# Patient Record
Sex: MALE | Race: OTHER | NOT HISPANIC OR LATINO | URBAN - METROPOLITAN AREA
[De-identification: names, ages, dates, MRNs, and addresses within clinical notes are randomized per-mention and may not be internally consistent; named-entity substitution may affect disease eponyms.]

---

## 2021-07-13 ENCOUNTER — INPATIENT (INPATIENT)
Facility: HOSPITAL | Age: 41
LOS: 1 days | Discharge: HOME | DRG: 864 | End: 2021-07-15
Attending: INTERNAL MEDICINE | Admitting: STUDENT IN AN ORGANIZED HEALTH CARE EDUCATION/TRAINING PROGRAM
Payer: COMMERCIAL

## 2021-07-13 VITALS
WEIGHT: 162.04 LBS | SYSTOLIC BLOOD PRESSURE: 117 MMHG | RESPIRATION RATE: 18 BRPM | HEIGHT: 69 IN | TEMPERATURE: 101 F | HEART RATE: 111 BPM | DIASTOLIC BLOOD PRESSURE: 67 MMHG | OXYGEN SATURATION: 96 %

## 2021-07-13 DIAGNOSIS — R50.9 FEVER, UNSPECIFIED: ICD-10-CM

## 2021-07-13 LAB
ALBUMIN SERPL ELPH-MCNC: 4.2 G/DL — SIGNIFICANT CHANGE UP (ref 3.3–5)
ALP SERPL-CCNC: 84 U/L — SIGNIFICANT CHANGE UP (ref 40–120)
ALT FLD-CCNC: 59 U/L — HIGH (ref 10–45)
ANION GAP SERPL CALC-SCNC: 15 MMOL/L — SIGNIFICANT CHANGE UP (ref 5–17)
APPEARANCE UR: CLEAR — SIGNIFICANT CHANGE UP
AST SERPL-CCNC: 78 U/L — HIGH (ref 10–40)
BACTERIA # UR AUTO: NEGATIVE — SIGNIFICANT CHANGE UP
BASE EXCESS BLDV CALC-SCNC: -0.8 MMOL/L — SIGNIFICANT CHANGE UP (ref -2–2)
BASE EXCESS BLDV CALC-SCNC: -0.9 MMOL/L — SIGNIFICANT CHANGE UP (ref -2–2)
BASOPHILS # BLD AUTO: 0 K/UL — SIGNIFICANT CHANGE UP (ref 0–0.2)
BASOPHILS NFR BLD AUTO: 0 % — SIGNIFICANT CHANGE UP (ref 0–2)
BILIRUB SERPL-MCNC: 2.1 MG/DL — HIGH (ref 0.2–1.2)
BILIRUB UR-MCNC: NEGATIVE — SIGNIFICANT CHANGE UP
BUN SERPL-MCNC: 12 MG/DL — SIGNIFICANT CHANGE UP (ref 7–23)
CA-I SERPL-SCNC: 1.1 MMOL/L — LOW (ref 1.12–1.3)
CA-I SERPL-SCNC: 1.2 MMOL/L — SIGNIFICANT CHANGE UP (ref 1.12–1.3)
CALCIUM SERPL-MCNC: 9.1 MG/DL — SIGNIFICANT CHANGE UP (ref 8.4–10.5)
CHLORIDE BLDV-SCNC: 103 MMOL/L — SIGNIFICANT CHANGE UP (ref 96–108)
CHLORIDE BLDV-SCNC: 103 MMOL/L — SIGNIFICANT CHANGE UP (ref 96–108)
CHLORIDE SERPL-SCNC: 99 MMOL/L — SIGNIFICANT CHANGE UP (ref 96–108)
CO2 BLDV-SCNC: 25 MMOL/L — SIGNIFICANT CHANGE UP (ref 22–30)
CO2 BLDV-SCNC: 26 MMOL/L — SIGNIFICANT CHANGE UP (ref 22–30)
CO2 SERPL-SCNC: 19 MMOL/L — LOW (ref 22–31)
COLOR SPEC: YELLOW — SIGNIFICANT CHANGE UP
COMMENT - URINE: SIGNIFICANT CHANGE UP
CREAT SERPL-MCNC: 1.21 MG/DL — SIGNIFICANT CHANGE UP (ref 0.5–1.3)
DIFF PNL FLD: NEGATIVE — SIGNIFICANT CHANGE UP
EOSINOPHIL # BLD AUTO: 0 K/UL — SIGNIFICANT CHANGE UP (ref 0–0.5)
EOSINOPHIL NFR BLD AUTO: 0 % — SIGNIFICANT CHANGE UP (ref 0–6)
EPI CELLS # UR: 0 /HPF — SIGNIFICANT CHANGE UP
GAS PNL BLDV: 131 MMOL/L — LOW (ref 135–145)
GAS PNL BLDV: 135 MMOL/L — SIGNIFICANT CHANGE UP (ref 135–145)
GAS PNL BLDV: SIGNIFICANT CHANGE UP
GLUCOSE BLDV-MCNC: 109 MG/DL — HIGH (ref 70–99)
GLUCOSE BLDV-MCNC: 118 MG/DL — HIGH (ref 70–99)
GLUCOSE SERPL-MCNC: 122 MG/DL — HIGH (ref 70–99)
GLUCOSE UR QL: ABNORMAL
HCO3 BLDV-SCNC: 24 MMOL/L — SIGNIFICANT CHANGE UP (ref 21–29)
HCO3 BLDV-SCNC: 25 MMOL/L — SIGNIFICANT CHANGE UP (ref 21–29)
HCT VFR BLD CALC: 42.2 % — SIGNIFICANT CHANGE UP (ref 39–50)
HCT VFR BLDA CALC: 44 % — SIGNIFICANT CHANGE UP (ref 39–50)
HCT VFR BLDA CALC: 48 % — SIGNIFICANT CHANGE UP (ref 39–50)
HGB BLD CALC-MCNC: 14.3 G/DL — SIGNIFICANT CHANGE UP (ref 13–17)
HGB BLD CALC-MCNC: 15.8 G/DL — SIGNIFICANT CHANGE UP (ref 13–17)
HGB BLD-MCNC: 14.9 G/DL — SIGNIFICANT CHANGE UP (ref 13–17)
KETONES UR-MCNC: NEGATIVE — SIGNIFICANT CHANGE UP
LACTATE BLDV-MCNC: 1.6 MMOL/L — SIGNIFICANT CHANGE UP (ref 0.7–2)
LACTATE BLDV-MCNC: 3.7 MMOL/L — HIGH (ref 0.7–2)
LEUKOCYTE ESTERASE UR-ACNC: NEGATIVE — SIGNIFICANT CHANGE UP
LYMPHOCYTES # BLD AUTO: 0.39 K/UL — LOW (ref 1–3.3)
LYMPHOCYTES # BLD AUTO: 8.6 % — LOW (ref 13–44)
MANUAL SMEAR VERIFICATION: SIGNIFICANT CHANGE UP
MCHC RBC-ENTMCNC: 30.3 PG — SIGNIFICANT CHANGE UP (ref 27–34)
MCHC RBC-ENTMCNC: 35.3 GM/DL — SIGNIFICANT CHANGE UP (ref 32–36)
MCV RBC AUTO: 85.8 FL — SIGNIFICANT CHANGE UP (ref 80–100)
MONOCYTES # BLD AUTO: 0.12 K/UL — SIGNIFICANT CHANGE UP (ref 0–0.9)
MONOCYTES NFR BLD AUTO: 2.6 % — SIGNIFICANT CHANGE UP (ref 2–14)
NEUTROPHILS # BLD AUTO: 3.94 K/UL — SIGNIFICANT CHANGE UP (ref 1.8–7.4)
NEUTROPHILS NFR BLD AUTO: 69 % — SIGNIFICANT CHANGE UP (ref 43–77)
NEUTS BAND # BLD: 17.2 % — HIGH (ref 0–8)
NITRITE UR-MCNC: NEGATIVE — SIGNIFICANT CHANGE UP
PCO2 BLDV: 40 MMHG — SIGNIFICANT CHANGE UP (ref 35–50)
PCO2 BLDV: 46 MMHG — SIGNIFICANT CHANGE UP (ref 35–50)
PH BLDV: 7.35 — SIGNIFICANT CHANGE UP (ref 7.35–7.45)
PH BLDV: 7.39 — SIGNIFICANT CHANGE UP (ref 7.35–7.45)
PH UR: 6 — SIGNIFICANT CHANGE UP (ref 5–8)
PLAT MORPH BLD: NORMAL — SIGNIFICANT CHANGE UP
PLATELET # BLD AUTO: 127 K/UL — LOW (ref 150–400)
PO2 BLDV: 24 MMHG — LOW (ref 25–45)
PO2 BLDV: 42 MMHG — SIGNIFICANT CHANGE UP (ref 25–45)
POTASSIUM BLDV-SCNC: 3.4 MMOL/L — LOW (ref 3.5–5.3)
POTASSIUM BLDV-SCNC: 3.9 MMOL/L — SIGNIFICANT CHANGE UP (ref 3.5–5.3)
POTASSIUM SERPL-MCNC: 3.5 MMOL/L — SIGNIFICANT CHANGE UP (ref 3.5–5.3)
POTASSIUM SERPL-SCNC: 3.5 MMOL/L — SIGNIFICANT CHANGE UP (ref 3.5–5.3)
PROT SERPL-MCNC: 7.4 G/DL — SIGNIFICANT CHANGE UP (ref 6–8.3)
PROT UR-MCNC: ABNORMAL
RAPID RVP RESULT: SIGNIFICANT CHANGE UP
RBC # BLD: 4.92 M/UL — SIGNIFICANT CHANGE UP (ref 4.2–5.8)
RBC # FLD: 12 % — SIGNIFICANT CHANGE UP (ref 10.3–14.5)
RBC BLD AUTO: SIGNIFICANT CHANGE UP
RBC CASTS # UR COMP ASSIST: 0 /HPF — SIGNIFICANT CHANGE UP (ref 0–4)
SAO2 % BLDV: 41 % — LOW (ref 67–88)
SAO2 % BLDV: 74 % — SIGNIFICANT CHANGE UP (ref 67–88)
SARS-COV-2 RNA SPEC QL NAA+PROBE: SIGNIFICANT CHANGE UP
SODIUM SERPL-SCNC: 133 MMOL/L — LOW (ref 135–145)
SP GR SPEC: 1.02 — SIGNIFICANT CHANGE UP (ref 1.01–1.02)
UROBILINOGEN FLD QL: ABNORMAL
VARIANT LYMPHS # BLD: 2.6 % — SIGNIFICANT CHANGE UP (ref 0–6)
WBC # BLD: 4.57 K/UL — SIGNIFICANT CHANGE UP (ref 3.8–10.5)
WBC # FLD AUTO: 4.57 K/UL — SIGNIFICANT CHANGE UP (ref 3.8–10.5)
WBC UR QL: 1 /HPF — SIGNIFICANT CHANGE UP (ref 0–5)

## 2021-07-13 PROCEDURE — 93010 ELECTROCARDIOGRAM REPORT: CPT

## 2021-07-13 PROCEDURE — 99285 EMERGENCY DEPT VISIT HI MDM: CPT

## 2021-07-13 PROCEDURE — 74177 CT ABD & PELVIS W/CONTRAST: CPT | Mod: 26,MA

## 2021-07-13 PROCEDURE — 71045 X-RAY EXAM CHEST 1 VIEW: CPT | Mod: 26

## 2021-07-13 PROCEDURE — 99223 1ST HOSP IP/OBS HIGH 75: CPT

## 2021-07-13 PROCEDURE — 76705 ECHO EXAM OF ABDOMEN: CPT | Mod: 26,RT

## 2021-07-13 RX ORDER — ACETAMINOPHEN 500 MG
650 TABLET ORAL ONCE
Refills: 0 | Status: COMPLETED | OUTPATIENT
Start: 2021-07-13 | End: 2021-07-13

## 2021-07-13 RX ORDER — SODIUM CHLORIDE 9 MG/ML
1000 INJECTION INTRAMUSCULAR; INTRAVENOUS; SUBCUTANEOUS
Refills: 0 | Status: DISCONTINUED | OUTPATIENT
Start: 2021-07-13 | End: 2021-07-14

## 2021-07-13 RX ORDER — PIPERACILLIN AND TAZOBACTAM 4; .5 G/20ML; G/20ML
3.38 INJECTION, POWDER, LYOPHILIZED, FOR SOLUTION INTRAVENOUS ONCE
Refills: 0 | Status: COMPLETED | OUTPATIENT
Start: 2021-07-13 | End: 2021-07-13

## 2021-07-13 RX ORDER — SODIUM CHLORIDE 9 MG/ML
2200 INJECTION INTRAMUSCULAR; INTRAVENOUS; SUBCUTANEOUS ONCE
Refills: 0 | Status: COMPLETED | OUTPATIENT
Start: 2021-07-13 | End: 2021-07-13

## 2021-07-13 RX ADMIN — Medication 650 MILLIGRAM(S): at 19:56

## 2021-07-13 RX ADMIN — PIPERACILLIN AND TAZOBACTAM 3.38 GRAM(S): 4; .5 INJECTION, POWDER, LYOPHILIZED, FOR SOLUTION INTRAVENOUS at 21:09

## 2021-07-13 RX ADMIN — PIPERACILLIN AND TAZOBACTAM 200 GRAM(S): 4; .5 INJECTION, POWDER, LYOPHILIZED, FOR SOLUTION INTRAVENOUS at 20:06

## 2021-07-13 RX ADMIN — SODIUM CHLORIDE 75 MILLILITER(S): 9 INJECTION INTRAMUSCULAR; INTRAVENOUS; SUBCUTANEOUS at 23:36

## 2021-07-13 RX ADMIN — SODIUM CHLORIDE 2200 MILLILITER(S): 9 INJECTION INTRAMUSCULAR; INTRAVENOUS; SUBCUTANEOUS at 17:54

## 2021-07-13 RX ADMIN — Medication 650 MILLIGRAM(S): at 17:53

## 2021-07-13 RX ADMIN — SODIUM CHLORIDE 2200 MILLILITER(S): 9 INJECTION INTRAMUSCULAR; INTRAVENOUS; SUBCUTANEOUS at 19:40

## 2021-07-13 NOTE — H&P ADULT - NSHPREVIEWOFSYSTEMS_GEN_ALL_CORE
NO chest pain/pressure.  No palpitations.  No HA, no focal weakness.  No back pain, no tearing back pain.  NO rash  No joint pain.    NO SI/HI.  NO thyroid symptoms.  NO weight loss, + anorexia.  No penile or scrotal pain.  No abdominal pain, no red blood per rectum or melena.

## 2021-07-13 NOTE — H&P ADULT - NSHPSOURCEINFOTX_GEN_ALL_CORE
History from patient with initially patient/ spouse in attendance with patient's permission--patient and spouse agreed to video  in German # [id number] but patient / spouse changed their mind FDC through interview and was conducted with spouse translating per patient's preference.

## 2021-07-13 NOTE — H&P ADULT - HISTORY OF PRESENT ILLNESS
NIGHT HOSPITALIST:   Patient UNKNOWN to me previously, assigned to me at this point via the ER to admit this 42 y/o M--patient seen with patient's spouse in attendance-- procedure as above in Croatian--patient follows no regular physician and no apparent past medical history--patient self refers to the ER following apparently mild diffuse body aches 5 days ago following a trip to a [place] and use of a swimming pool with his spouse and others.  Patient with fevers for the past 3 days to 103F.  Patient did not venture to a wooded area and no known tick bite.  No chills.   NO cough, no diarrhoea, no dysuria, no rash, no joint pain.  Patient with dark urine but denies hematuria, no scrotal pain, no thigh pain.  NO known contact with crustacean organisms.  Patient with anorexia but denies weight loss.  NO HA, no neck stiffness.  NO diaphoresis.  No known sick contacts.

## 2021-07-13 NOTE — H&P ADULT - NSHPPHYSICALEXAM_GEN_ALL_CORE
Physical exam with a young, nontoxic but ill appearing M in no acute distress.    Tm 103.1F.     RR 14   /91   99% on RA    HEENT< PERRL< EOMI, oropharynx clear  Neck supple  NO cervical adenopathy appreciated.  No thyromegaly  Chest clear  Cor s1 s2 tachycardia  Abdomen soft nontender, normal bowel sounds, no rebound.  Groin with no scrotal swelling or penile discharge, erythema  Ext no areas of rash or crepitus.  NO noted bite marks or ulcers.  Neurologic AxOx3.  Speech fluent.  cognition intact.  UE/LE 5/5.  No SI/HI.

## 2021-07-13 NOTE — ED PROVIDER NOTE - ATTENDING CONTRIBUTION TO CARE
42yo male no pmhx presents with fever and dark urination since Sunday. Pt went to [place] during the weekend, no hiking or tick exposure. Denies any rash. On sunday had a temp of 103 and felt bodyaches. Pt states he may have noticed dark urination last week prior then recurrent on Sunday. + Social alcohol w decreased water intake. Denies any smoking or drug abuse. Denies cough, uri, sore throat, n/v/d, abd pain, dysuria or discharge. Works at [employer].   On exam, Patient is awake, alert and oriented x 3.  Patient is well appearing and in no acute distress.  NCAT, PERRL, EOMI.  Neck is supple, No LAD.  Lungs are CTA B/L,+S1S2 no murmurs,  Abdomen:Soft nd/nt+bs no rebound or guarding. No cvat.   Extremity no edema or calf tender.  Skin with no rash.  Neuro CN3-12 intact. Strength 5/5 in upper and lower extremities. Nml Sensation.Gait normal.   Check labs, cultures, RVP. IVF and re eval.

## 2021-07-13 NOTE — ED PROVIDER NOTE - PHYSICAL EXAMINATION
ABG drawn x 1 from Right Radial. Patient had Normal Don's Test.  Patient was on 2 liters/min via nasal cannula  at time of puncture. Pressure held for 5. No bleeding or bruising noted at puncture site. Patient tolerated procedure well.       Performed by Myra Valdez RCP CONSTITUTIONAL: Patient is awake, alert and oriented x 3. Patient is well appearing and in no acute distress  HEAD: NCAT  NECK: supple, FROM  LUNGS: CTA B/L  HEART: RRR  ABDOMEN: Soft nd/nttp, no rebound or guarding  EXTREMITY: no edema or calf tenderness b/l, FROM upper and lower ext b/l  SKIN: with no rash or lesions  NEURO: No focal deficits

## 2021-07-13 NOTE — H&P ADULT - NSHPLABSRESULTS_GEN_ALL_CORE
WBC 4.5    69% N  17% BANDS    Hgb 14.9    Platelets of 127K.    RVP and COVID-19 PCR>>negative.    Random glucose of 122.  Cr 1.2    Lactate of 3.7>>1.6    UA with trace protein, +++ glucose.    Total bili 2.1    AST 78  ALT 59        CTT abdomen with cholelithiasis otherwise nondiagnostic.    RUQ US with cholelithiasis. WBC 4.5    69% N  17% BANDS    Hgb 14.9    Platelets of 127K.    RVP and COVID-19 PCR>>negative.    Random glucose of 122.  Cr 1.2    Lactate of 3.7>>1.6    UA with trace protein, +++ glucose.    Total bili 2.1    AST 78  ALT 59        CTT abdomen with cholelithiasis otherwise nondiagnostic.    RUQ US with cholelithiasis.    Chest radiograph reviewed with no infiltrate or effusion.

## 2021-07-13 NOTE — H&P ADULT - PROBLEM SELECTOR PLAN 1
See above.  Empiric IV Zosyn and will add IV Vancomycin.   Lyme, babesiosis and ehrlichiosis serologies sent.   Would consider formal ID opinion in the AM.

## 2021-07-13 NOTE — ED ADULT NURSE NOTE - OBJECTIVE STATEMENT
40 y/o male presenting to the ED ambulatory, A&Ox3, complaining of fevers and chills x3 days. Pt states he was away over the weekend started to develop fevers, chills, body aches and noticed dark urine. Pt states he has been voiding less and noticed it being more mary carmen over the last week. Pt denies headache, back pain, painful urination, dysuria, frequency, nausea, vomiting, hematuria, melana 40 y/o male presenting to the ED ambulatory, A&Ox3, complaining of fevers and chills x3 days. Pt states he was away over the weekend started to develop fevers, chills, body aches and noticed dark urine. Pt states he has been voiding less and noticed it being more mary carmen over the last week. Pt denies headache, back pain, painful urination, burning, discharge,  dysuria, frequency, chest pain, shortness of breath, nausea, vomiting, hematuria, diarrhea, melena, decreased PO intake, and lower extremity weakness. Lungs clear, abdomen soft nontender, no swelling noted. Code sepsis called on arrival to ED. Cardiac Monitor placed showing NSR. Safety and comfort measures provided, bed locked and in lowest position, side rails up for safety. Call bell within reach. Awaiting Results

## 2021-07-13 NOTE — H&P ADULT - ASSESSMENT
NIGHT HOSPITALIST:    NIGHT HOSPITALIST:    Febrile syndrome in the setting of patient with fever, tachycardia, with myalgias, nondiagnostic UA but with significant bandemia.  Blood culture and urine cultures sent.  Will continue with IV Zosyn for empiric treatment and will add IV Vancomycin for SIRS.  Unclear if this is related to Lyme or babesiosis or ehrlichiosis but will sent serologies.   Would consider formal ID consult in the AM.   Will check coagulation profile to exclude coagulopathy.  Will provide IVF.    Patient agrees to pharmacologic DVT prophylaxis.

## 2021-07-13 NOTE — ED ADULT NURSE REASSESSMENT NOTE - NS ED NURSE REASSESS COMMENT FT1
Break coverage RN AT: Pt A&Ox3, speaking coherently. ID verified w/ wife at bedside. VS reassessed. Denies any pain/discomfort, does not appear to be in any acute distress. PIV patent and flushing w/o difficulty, no s/s infection/infiltration. To receive IV abx. Pt verbalizes understanding of plan of care, all questions answered, all needs met. Safety and comfort measures provided. Bed locked and in lowest position, side rails up for safety. Call bell within reach.

## 2021-07-13 NOTE — H&P ADULT - NSHPADDITIONALINFOADULT_GEN_ALL_CORE
NIGHT HOSPITALIST:   Patient/ spouse aware of course and agree with plan/care as above.    Emotional support provided to patient/ spouse.  Care reviewed with covering NP, Michelle, for endorsement to my physician colleagues.    Vladimir Evans MD  [phone]

## 2021-07-13 NOTE — ED PROVIDER NOTE - OBJECTIVE STATEMENT
41 year old male with no sig pmhx presents to ED c/o fevers x 3 days with dark urine today. Pt reports he was away this past weekend with friends      Translation provided by pt family at bedside 41 year old male with no sig pmhx presents to ED c/o fevers x 3 days with dark urine today. Pt reports he had mild body aches 5 days ago and then went on trip  this past weekend with friends upon return home had onset of fevers which have persisted for past 3 days with tmax 103 at home. Pt also noticed onset of dark urine today and was seen at urgent care and referred to ED for further evaluation. Pt denies sick contacts, HA, cough, chest pain, sob, abd pain, n/v/d, dysuria.  Pt fully vaccinated for Covid     Translation provided by pt family at bedside

## 2021-07-14 DIAGNOSIS — Z29.9 ENCOUNTER FOR PROPHYLACTIC MEASURES, UNSPECIFIED: ICD-10-CM

## 2021-07-14 DIAGNOSIS — Z02.9 ENCOUNTER FOR ADMINISTRATIVE EXAMINATIONS, UNSPECIFIED: ICD-10-CM

## 2021-07-14 DIAGNOSIS — R50.9 FEVER, UNSPECIFIED: ICD-10-CM

## 2021-07-14 DIAGNOSIS — E86.0 DEHYDRATION: ICD-10-CM

## 2021-07-14 LAB
ANION GAP SERPL CALC-SCNC: 12 MMOL/L — SIGNIFICANT CHANGE UP (ref 5–17)
B BURGDOR C6 AB SER-ACNC: NEGATIVE — SIGNIFICANT CHANGE UP
B BURGDOR IGG+IGM SER-ACNC: 0.26 INDEX — SIGNIFICANT CHANGE UP (ref 0.01–0.89)
BASOPHILS # BLD AUTO: 0.02 K/UL — SIGNIFICANT CHANGE UP (ref 0–0.2)
BASOPHILS NFR BLD AUTO: 0.9 % — SIGNIFICANT CHANGE UP (ref 0–2)
BUN SERPL-MCNC: 8 MG/DL — SIGNIFICANT CHANGE UP (ref 7–23)
CALCIUM SERPL-MCNC: 9 MG/DL — SIGNIFICANT CHANGE UP (ref 8.4–10.5)
CHLORIDE SERPL-SCNC: 100 MMOL/L — SIGNIFICANT CHANGE UP (ref 96–108)
CO2 SERPL-SCNC: 22 MMOL/L — SIGNIFICANT CHANGE UP (ref 22–31)
CREAT SERPL-MCNC: 1.1 MG/DL — SIGNIFICANT CHANGE UP (ref 0.5–1.3)
CULTURE RESULTS: NO GROWTH — SIGNIFICANT CHANGE UP
CULTURE RESULTS: SIGNIFICANT CHANGE UP
EOSINOPHIL # BLD AUTO: 0 K/UL — SIGNIFICANT CHANGE UP (ref 0–0.5)
EOSINOPHIL NFR BLD AUTO: 0 % — SIGNIFICANT CHANGE UP (ref 0–6)
GLUCOSE SERPL-MCNC: 128 MG/DL — HIGH (ref 70–99)
HAV IGM SER-ACNC: SIGNIFICANT CHANGE UP
HBV CORE IGM SER-ACNC: SIGNIFICANT CHANGE UP
HBV SURFACE AG SER-ACNC: SIGNIFICANT CHANGE UP
HCT VFR BLD CALC: 42.1 % — SIGNIFICANT CHANGE UP (ref 39–50)
HCV AB S/CO SERPL IA: 0.13 S/CO — SIGNIFICANT CHANGE UP (ref 0–0.99)
HCV AB SERPL-IMP: SIGNIFICANT CHANGE UP
HGB BLD-MCNC: 15.1 G/DL — SIGNIFICANT CHANGE UP (ref 13–17)
LYMPHOCYTES # BLD AUTO: 0.37 K/UL — LOW (ref 1–3.3)
LYMPHOCYTES # BLD AUTO: 14.6 % — SIGNIFICANT CHANGE UP (ref 13–44)
MANUAL SMEAR VERIFICATION: SIGNIFICANT CHANGE UP
MCHC RBC-ENTMCNC: 30.6 PG — SIGNIFICANT CHANGE UP (ref 27–34)
MCHC RBC-ENTMCNC: 35.9 GM/DL — SIGNIFICANT CHANGE UP (ref 32–36)
MCV RBC AUTO: 85.4 FL — SIGNIFICANT CHANGE UP (ref 80–100)
MONOCYTES # BLD AUTO: 0.13 K/UL — SIGNIFICANT CHANGE UP (ref 0–0.9)
MONOCYTES NFR BLD AUTO: 5.2 % — SIGNIFICANT CHANGE UP (ref 2–14)
NEUTROPHILS # BLD AUTO: 2.01 K/UL — SIGNIFICANT CHANGE UP (ref 1.8–7.4)
NEUTROPHILS NFR BLD AUTO: 58.6 % — SIGNIFICANT CHANGE UP (ref 43–77)
NEUTS BAND # BLD: 19.8 % — HIGH (ref 0–8)
PLAT MORPH BLD: NORMAL — SIGNIFICANT CHANGE UP
PLATELET # BLD AUTO: 84 K/UL — LOW (ref 150–400)
POTASSIUM SERPL-MCNC: 3.4 MMOL/L — LOW (ref 3.5–5.3)
POTASSIUM SERPL-SCNC: 3.4 MMOL/L — LOW (ref 3.5–5.3)
RBC # BLD: 4.93 M/UL — SIGNIFICANT CHANGE UP (ref 4.2–5.8)
RBC # FLD: 12 % — SIGNIFICANT CHANGE UP (ref 10.3–14.5)
RBC BLD AUTO: SIGNIFICANT CHANGE UP
SODIUM SERPL-SCNC: 134 MMOL/L — LOW (ref 135–145)
SPECIMEN SOURCE: SIGNIFICANT CHANGE UP
SPECIMEN SOURCE: SIGNIFICANT CHANGE UP
VARIANT LYMPHS # BLD: 0.9 % — SIGNIFICANT CHANGE UP (ref 0–6)
WBC # BLD: 2.56 K/UL — LOW (ref 3.8–10.5)
WBC # FLD AUTO: 2.56 K/UL — LOW (ref 3.8–10.5)

## 2021-07-14 PROCEDURE — 99222 1ST HOSP IP/OBS MODERATE 55: CPT

## 2021-07-14 RX ORDER — VANCOMYCIN HCL 1 G
1000 VIAL (EA) INTRAVENOUS EVERY 12 HOURS
Refills: 0 | Status: DISCONTINUED | OUTPATIENT
Start: 2021-07-14 | End: 2021-07-14

## 2021-07-14 RX ORDER — POTASSIUM CHLORIDE 20 MEQ
40 PACKET (EA) ORAL ONCE
Refills: 0 | Status: COMPLETED | OUTPATIENT
Start: 2021-07-14 | End: 2021-07-14

## 2021-07-14 RX ORDER — SODIUM CHLORIDE 9 MG/ML
1000 INJECTION INTRAMUSCULAR; INTRAVENOUS; SUBCUTANEOUS
Refills: 0 | Status: DISCONTINUED | OUTPATIENT
Start: 2021-07-14 | End: 2021-07-15

## 2021-07-14 RX ORDER — PIPERACILLIN AND TAZOBACTAM 4; .5 G/20ML; G/20ML
3.38 INJECTION, POWDER, LYOPHILIZED, FOR SOLUTION INTRAVENOUS EVERY 8 HOURS
Refills: 0 | Status: DISCONTINUED | OUTPATIENT
Start: 2021-07-14 | End: 2021-07-14

## 2021-07-14 RX ORDER — ACETAMINOPHEN 500 MG
650 TABLET ORAL EVERY 6 HOURS
Refills: 0 | Status: DISCONTINUED | OUTPATIENT
Start: 2021-07-14 | End: 2021-07-15

## 2021-07-14 RX ORDER — ACETAMINOPHEN 500 MG
650 TABLET ORAL ONCE
Refills: 0 | Status: COMPLETED | OUTPATIENT
Start: 2021-07-14 | End: 2021-07-14

## 2021-07-14 RX ORDER — VANCOMYCIN HCL 1 G
1000 VIAL (EA) INTRAVENOUS ONCE
Refills: 0 | Status: COMPLETED | OUTPATIENT
Start: 2021-07-14 | End: 2021-07-14

## 2021-07-14 RX ORDER — VANCOMYCIN HCL 1 G
VIAL (EA) INTRAVENOUS
Refills: 0 | Status: DISCONTINUED | OUTPATIENT
Start: 2021-07-14 | End: 2021-07-14

## 2021-07-14 RX ORDER — ENOXAPARIN SODIUM 100 MG/ML
40 INJECTION SUBCUTANEOUS DAILY
Refills: 0 | Status: DISCONTINUED | OUTPATIENT
Start: 2021-07-14 | End: 2021-07-15

## 2021-07-14 RX ADMIN — Medication 40 MILLIEQUIVALENT(S): at 17:30

## 2021-07-14 RX ADMIN — Medication 650 MILLIGRAM(S): at 03:52

## 2021-07-14 RX ADMIN — PIPERACILLIN AND TAZOBACTAM 25 GRAM(S): 4; .5 INJECTION, POWDER, LYOPHILIZED, FOR SOLUTION INTRAVENOUS at 03:14

## 2021-07-14 RX ADMIN — SODIUM CHLORIDE 100 MILLILITER(S): 9 INJECTION INTRAMUSCULAR; INTRAVENOUS; SUBCUTANEOUS at 00:47

## 2021-07-14 RX ADMIN — Medication 650 MILLIGRAM(S): at 21:49

## 2021-07-14 RX ADMIN — Medication 650 MILLIGRAM(S): at 00:18

## 2021-07-14 RX ADMIN — Medication 250 MILLIGRAM(S): at 00:41

## 2021-07-14 RX ADMIN — Medication 110 MILLIGRAM(S): at 11:29

## 2021-07-14 RX ADMIN — Medication 650 MILLIGRAM(S): at 05:05

## 2021-07-14 RX ADMIN — Medication 650 MILLIGRAM(S): at 22:20

## 2021-07-14 RX ADMIN — Medication 110 MILLIGRAM(S): at 21:50

## 2021-07-14 RX ADMIN — Medication 650 MILLIGRAM(S): at 14:27

## 2021-07-14 NOTE — PROGRESS NOTE ADULT - ASSESSMENT
patient with fever, tachycardia, with myalgias, nondiagnostic UA but with significant bandemia.    r/o  babesiosis or ehrlichiosis /sent serologies.   ID consult          Problem/Plan - 1:  ·  Problem: Fever, unspecified fever cause.  Plan: See above.   dxycycline as per id    Lyme, babesiosis and ehrlichiosis serologies sent.      Problem/Plan - 2:  ·  Problem: Dehydration.  .   Will continue with IVF.   inc diet     Problem/Plan - 3:  ·  Problem: Need for prophylactic measure.  Plan: Patient agrees to pharmacologic DVT prophylaxis.     :       patient with fever, tachycardia, with myalgias, nondiagnostic UA but with significant bandemia.    r/o  babesiosis or ehrlichiosis /sent serologies.   ID consult          Problem/Plan - 1:  ·  Problem: Fever, unspecified fever cause.  Plan: See above.   dxycycline as per id    Lyme, babesiosis and ehrlichiosis serologies sent.      Problem/Plan - 2:  ·  Problem: Dehydration.  .   Will continue with IVF.   inc diet     Problem/Plan - 3:  ·  Problem: Need for prophylactic measure.  Plan: Patient agrees to pharmacologic DVT prophylaxis.     :   dr restrepo to cover me till further notice

## 2021-07-14 NOTE — CONSULT NOTE ADULT - ASSESSMENT
41 year old in good health no underlying medical problems was in [facility]   presents with days of fever, malasie, mild HA and no other localizing signs  negative ct of chest and abdomen. no history of tick bite or rash or IVDA  temps t o103.  bc pending     differential includes   babesia  ehrlichia    enterovirus  Lyme unlikely   doubt bacterial infection but bc pending   no signs of cns infection    dc vanco and zosyn  await bc  start doxycycline   serologies ordered 
elevated lfts  sepsis    doubt primary hepatobiliary pathology  ct and u/s noted  trend lfts  no need for further imaging  f/u cultures   antibiotics per ID  will follow    Advanced care planning was discussed with patient and family.  Advanced care planning forms were reviewed and discussed.  Risks, benefits and alternatives of gastroenterologic procedures were discussed in detail and all questions were answered.    30 minutes spent.

## 2021-07-14 NOTE — PATIENT PROFILE ADULT - TELEPHONIC ID NUMBER OF THE INTERPRETER
- monitor CBC Q 8 hrs  - transfuse for hbg <7  - monitor BP, stop HTN meds  - holding ASA   - Protonix gtt  - Appreciate colorectal surgery assistance.   -Anoscopy yesterday unrevealing.   -Colonoscopy 11/6: Notified by CRS that a source of bleeding was not found in the colon but there was blood in TI.   - Endoscopy 11/7: No active site of bleeding seen. At this point GI recommends full diet today, CLD tomorrow, and Golytely spilt prep tomorrow night for VCE on Thursday.  -No active bleeding today; Pt reports his last blood BM was 2 days ago   [id number]

## 2021-07-14 NOTE — PROVIDER CONTACT NOTE (OTHER) - ACTION/TREATMENT ORDERED:
STAT 650mg of tylenol ordered. Ice packs put all over patient. Will recheck temperature in 1 hour. Will continue to monitor patient.

## 2021-07-14 NOTE — CONSULT NOTE ADULT - SUBJECTIVE AND OBJECTIVE BOX
Patient is a 41y old  Male who presents with a chief complaint of Fever, myalgias for 5 days (13 Jul 2021 23:30)    HPI:  NIGHT HOSPITALIST:   Patient UNKNOWN to me previously, assigned to me at this point via the ER to admit this 42 y/o M--patient seen with patient's spouse in attendance-- procedure as above in Thai--patient follows no regular physician and no apparent past medical history--patient self refers to the ER following apparently mild diffuse body aches 5 days ago following a trip to [place] [employer] [place] and use of a swimming pool with his spouse and others.  Patient with fevers for the past 3 days to 103F.  Patient did not venture to a wooded area and no known tick bite.  No chills.   NO cough, no diarrhoea, no dysuria, no rash, no joint pain.  Patient with dark urine but denies hematuria, no scrotal pain, no thigh pain.  NO known contact with crustacean organisms.  Patient with anorexia but denies weight loss.  NO HA, no neck stiffness.  NO diaphoresis.  No known sick contacts. (13 Jul 2021 23:30)      PAST MEDICAL & SURGICAL HISTORY:  No pertinent past medical history    No significant past surgical history        Social history:    FAMILY HISTORY:  No pertinent family history in first degree relatives              No Known Allergies    Intolerances        Antimicrobials:    piperacillin/tazobactam IVPB.. 3.375 Gram(s) IV Intermittent every 8 hours  vancomycin  IVPB      vancomycin  IVPB 1000 milliGRAM(s) IV Intermittent every 12 hours        Vital Signs Last 24 Hrs  T(C): 38.5 (14 Jul 2021 05:04), Max: 39.5 (13 Jul 2021 23:36)  T(F): 101.3 (14 Jul 2021 05:04), Max: 103.1 (13 Jul 2021 23:36)  HR: 111 (14 Jul 2021 05:04) (86 - 122)  BP: 123/69 (14 Jul 2021 05:04) (111/74 - 158/86)  BP(mean): 98 (13 Jul 2021 22:48) (98 - 103)  RR: 18 (14 Jul 2021 05:04) (12 - 27)  SpO2: 97% (14 Jul 2021 05:04) (95% - 100%)    PHYSICAL EXAM:Pleasant patient in no acute distress.           Eyes:PERRL EOMI.NO discharge or conjunctival injection    ENMT:No sinus tenderness.No thrush.No pharyngeal exudate or erythema.Fair dental hygiene    Neck:Supple,No LN,no JVD      Respiratory:Good air entry bilaterally,CTA    Cardiovascular:S1 S2 wnl, No murmurs,rub or gallops    Gastrointestinal:Soft BS(+) no tenderness no masses ,No rebound or guarding    Genitourinary:No CVA tendereness     Rectal:    Extremities:No cyanosis,clubbing or edema.    Vascular:peripheral pulses felt    Neurological:AAO X 3,No grossly focal deficits    Skin:No rash     Lymph Nodes:No palpable LNs    Musculoskeletal:No joint swelling or LOM    Psychiatric:Affect normal.                                14.9   4.57  )-----------( 127      ( 13 Jul 2021 17:53 )             42.2         07-13    133<L>  |  99  |  12  ----------------------------<  122<H>  3.5   |  19<L>  |  1.21    Ca    9.1      13 Jul 2021 17:53    TPro  7.4  /  Alb  4.2  /  TBili  2.1<H>  /  DBili  x   /  AST  78<H>  /  ALT  59<H>  /  AlkPhos  84  07-13      RECENT CULTURES:      MICROBIOLOGY:          Radiology:      Assessment:        Recommendations and Plan:    Pager [phone]  After 5 pm/weekends or if no response :[phone]              
[facility]  [place] [address]  [phone]      Chief Complaint:  Patient is a 41y old  Male who presents with a chief complaint of Fever, myalgias for 5 days (2021 09:46)      HPI: 40 y/o M--patient seen with patient's spouse in attendance-- procedure as above in Armenian--patient follows no regular physician and no apparent past medical history--patient self refers to the ER following apparently mild diffuse body aches 5 days ago following a trip to [place] [employer] [place] and use of a swimming pool with his spouse and others.  Patient with fevers for the past 3 days to 103F.  Patient did not venture to a wooded area and no known tick bite.  No chills.   NO cough, no diarrhoea, no dysuria, no rash, no joint pain.  Patient with dark urine but denies hematuria, no scrotal pain, no thigh pain.  NO known contact with crustacean organisms.  Patient with anorexia but denies weight loss.  NO HA, no neck stiffness.  NO diaphoresis.  No known sick contacts.    Allergies:  No Known Allergies      Medications:  acetaminophen   Tablet .. 650 milliGRAM(s) Oral every 6 hours PRN  doxycycline IVPB      doxycycline IVPB 100 milliGRAM(s) IV Intermittent every 12 hours  enoxaparin Injectable 40 milliGRAM(s) SubCutaneous daily  sodium chloride 0.9%. 1000 milliLiter(s) IV Continuous <Continuous>      PMHX/PSHX:  No pertinent past medical history    No significant past surgical history        Family history:  No pertinent family history in first degree relatives        Social History:     ROS:     General:  No wt loss, fevers, chills, night sweats, fatigue,   Eyes:  Good vision, no reported pain  ENT:  No sore throat, pain, runny nose, dysphagia  CV:  No pain, palpitations, hypo/hypertension  Resp:  No dyspnea, cough, tachypnea, wheezing  GI:  No pain, No nausea, No vomiting, No diarrhea, No constipation, No weight loss, No fever, No pruritis, No rectal bleeding, No tarry stools, No dysphagia,  :  No pain, bleeding, incontinence, nocturia  Muscle:  No pain, weakness  Neuro:  No weakness, tingling, memory problems  Psych:  No fatigue, insomnia, mood problems, depression  Endocrine:  No polyuria, polydipsia, cold/heat intolerance  Heme:  No petechiae, ecchymosis, easy bruisability  Skin:  No rash, tattoos, scars, edema      PHYSICAL EXAM:   Vital Signs:  Vital Signs Last 24 Hrs  T(C): 37.4 (2021 10:11), Max: 39.5 (2021 23:36)  T(F): 99.4 (2021 10:11), Max: 103.1 (2021 23:36)  HR: 104 (2021 10:11) (86 - 122)  BP: 122/75 (2021 10:11) (111/74 - 158/86)  BP(mean): 98 (2021 22:48) (98 - 103)  RR: 18 (2021 10:11) (12 - 27)  SpO2: 98% (2021 10:11) (95% - 100%)  Daily Height in cm: 175.26 (2021 02:55)    Daily     GENERAL:  Appears stated age,   HEENT:  NC/AT,    CHEST:  Full & symmetric excursion,   HEART:  Regular rhythm  ABDOMEN:  Soft, non-tender, non-distended,   EXTEREMITIES:  no cyanosis,clubbing or edema  SKIN:  No rash  NEURO:  Alert,    LABS:                        15.1   2.56  )-----------( x        ( 2021 11:51 )             42.1     07-13    133<L>  |  99  |  12  ----------------------------<  122<H>  3.5   |  19<L>  |  1.21    Ca    9.1      2021 17:53    TPro  7.4  /  Alb  4.2  /  TBili  2.1<H>  /  DBili  x   /  AST  78<H>  /  ALT  59<H>  /  AlkPhos  84  07-13    LIVER FUNCTIONS - ( 2021 17:53 )  Alb: 4.2 g/dL / Pro: 7.4 g/dL / ALK PHOS: 84 U/L / ALT: 59 U/L / AST: 78 U/L / GGT: x             Urinalysis Basic - ( 2021 18:20 )    Color: Yellow / Appearance: Clear / S.018 / pH: x  Gluc: x / Ketone: Negative  / Bili: Negative / Urobili: 3 mg/dL   Blood: x / Protein: Trace / Nitrite: Negative   Leuk Esterase: Negative / RBC: 0 /hpf / WBC 1 /HPF   Sq Epi: x / Non Sq Epi: 0 /hpf / Bacteria: Negative          Imaging:

## 2021-07-15 VITALS
RESPIRATION RATE: 18 BRPM | SYSTOLIC BLOOD PRESSURE: 148 MMHG | HEART RATE: 87 BPM | OXYGEN SATURATION: 98 % | DIASTOLIC BLOOD PRESSURE: 75 MMHG | TEMPERATURE: 98 F

## 2021-07-15 LAB
ANION GAP SERPL CALC-SCNC: 12 MMOL/L — SIGNIFICANT CHANGE UP (ref 5–17)
BASOPHILS # BLD AUTO: 0 K/UL — SIGNIFICANT CHANGE UP (ref 0–0.2)
BASOPHILS NFR BLD AUTO: 0 % — SIGNIFICANT CHANGE UP (ref 0–2)
BUN SERPL-MCNC: 7 MG/DL — SIGNIFICANT CHANGE UP (ref 7–23)
CALCIUM SERPL-MCNC: 9.1 MG/DL — SIGNIFICANT CHANGE UP (ref 8.4–10.5)
CHLORIDE SERPL-SCNC: 101 MMOL/L — SIGNIFICANT CHANGE UP (ref 96–108)
CO2 SERPL-SCNC: 21 MMOL/L — LOW (ref 22–31)
COVID-19 SPIKE DOMAIN AB INTERP: POSITIVE
COVID-19 SPIKE DOMAIN ANTIBODY RESULT: >250 U/ML — HIGH
CREAT SERPL-MCNC: 0.9 MG/DL — SIGNIFICANT CHANGE UP (ref 0.5–1.3)
EOSINOPHIL # BLD AUTO: 0.02 K/UL — SIGNIFICANT CHANGE UP (ref 0–0.5)
EOSINOPHIL NFR BLD AUTO: 0.9 % — SIGNIFICANT CHANGE UP (ref 0–6)
GLUCOSE SERPL-MCNC: 110 MG/DL — HIGH (ref 70–99)
HCT VFR BLD CALC: 40.1 % — SIGNIFICANT CHANGE UP (ref 39–50)
HGB BLD-MCNC: 14.2 G/DL — SIGNIFICANT CHANGE UP (ref 13–17)
LYMPHOCYTES # BLD AUTO: 1.07 K/UL — SIGNIFICANT CHANGE UP (ref 1–3.3)
LYMPHOCYTES # BLD AUTO: 38.8 % — SIGNIFICANT CHANGE UP (ref 13–44)
MCHC RBC-ENTMCNC: 30 PG — SIGNIFICANT CHANGE UP (ref 27–34)
MCHC RBC-ENTMCNC: 35.4 GM/DL — SIGNIFICANT CHANGE UP (ref 32–36)
MCV RBC AUTO: 84.8 FL — SIGNIFICANT CHANGE UP (ref 80–100)
MONOCYTES # BLD AUTO: 0.3 K/UL — SIGNIFICANT CHANGE UP (ref 0–0.9)
MONOCYTES NFR BLD AUTO: 10.8 % — SIGNIFICANT CHANGE UP (ref 2–14)
NEUTROPHILS # BLD AUTO: 1.22 K/UL — LOW (ref 1.8–7.4)
NEUTROPHILS NFR BLD AUTO: 35.1 % — LOW (ref 43–77)
PLATELET # BLD AUTO: 83 K/UL — LOW (ref 150–400)
POTASSIUM SERPL-MCNC: 3.9 MMOL/L — SIGNIFICANT CHANGE UP (ref 3.5–5.3)
POTASSIUM SERPL-SCNC: 3.9 MMOL/L — SIGNIFICANT CHANGE UP (ref 3.5–5.3)
RBC # BLD: 4.73 M/UL — SIGNIFICANT CHANGE UP (ref 4.2–5.8)
RBC # FLD: 12.1 % — SIGNIFICANT CHANGE UP (ref 10.3–14.5)
SARS-COV-2 IGG+IGM SERPL QL IA: >250 U/ML — HIGH
SARS-COV-2 IGG+IGM SERPL QL IA: POSITIVE
SODIUM SERPL-SCNC: 134 MMOL/L — LOW (ref 135–145)
WBC # BLD: 2.76 K/UL — LOW (ref 3.8–10.5)
WBC # FLD AUTO: 2.76 K/UL — LOW (ref 3.8–10.5)

## 2021-07-15 PROCEDURE — 86757 RICKETTSIA ANTIBODY: CPT

## 2021-07-15 PROCEDURE — 83605 ASSAY OF LACTIC ACID: CPT

## 2021-07-15 PROCEDURE — 80048 BASIC METABOLIC PNL TOTAL CA: CPT

## 2021-07-15 PROCEDURE — 84295 ASSAY OF SERUM SODIUM: CPT

## 2021-07-15 PROCEDURE — 82947 ASSAY GLUCOSE BLOOD QUANT: CPT

## 2021-07-15 PROCEDURE — 86618 LYME DISEASE ANTIBODY: CPT

## 2021-07-15 PROCEDURE — 82330 ASSAY OF CALCIUM: CPT

## 2021-07-15 PROCEDURE — 80053 COMPREHEN METABOLIC PANEL: CPT

## 2021-07-15 PROCEDURE — 82803 BLOOD GASES ANY COMBINATION: CPT

## 2021-07-15 PROCEDURE — 99232 SBSQ HOSP IP/OBS MODERATE 35: CPT

## 2021-07-15 PROCEDURE — 85018 HEMOGLOBIN: CPT

## 2021-07-15 PROCEDURE — 74177 CT ABD & PELVIS W/CONTRAST: CPT

## 2021-07-15 PROCEDURE — 87798 DETECT AGENT NOS DNA AMP: CPT

## 2021-07-15 PROCEDURE — 86666 EHRLICHIA ANTIBODY: CPT

## 2021-07-15 PROCEDURE — 82550 ASSAY OF CK (CPK): CPT

## 2021-07-15 PROCEDURE — 81001 URINALYSIS AUTO W/SCOPE: CPT

## 2021-07-15 PROCEDURE — G0378: CPT

## 2021-07-15 PROCEDURE — 87040 BLOOD CULTURE FOR BACTERIA: CPT

## 2021-07-15 PROCEDURE — 80074 ACUTE HEPATITIS PANEL: CPT

## 2021-07-15 PROCEDURE — 87086 URINE CULTURE/COLONY COUNT: CPT

## 2021-07-15 PROCEDURE — 85025 COMPLETE CBC W/AUTO DIFF WBC: CPT

## 2021-07-15 PROCEDURE — 85014 HEMATOCRIT: CPT

## 2021-07-15 PROCEDURE — 84132 ASSAY OF SERUM POTASSIUM: CPT

## 2021-07-15 PROCEDURE — 82435 ASSAY OF BLOOD CHLORIDE: CPT

## 2021-07-15 PROCEDURE — 86769 SARS-COV-2 COVID-19 ANTIBODY: CPT

## 2021-07-15 PROCEDURE — 71045 X-RAY EXAM CHEST 1 VIEW: CPT

## 2021-07-15 PROCEDURE — 86753 PROTOZOA ANTIBODY NOS: CPT

## 2021-07-15 PROCEDURE — 99285 EMERGENCY DEPT VISIT HI MDM: CPT

## 2021-07-15 PROCEDURE — 0225U NFCT DS DNA&RNA 21 SARSCOV2: CPT

## 2021-07-15 PROCEDURE — 76705 ECHO EXAM OF ABDOMEN: CPT

## 2021-07-15 RX ORDER — ACETAMINOPHEN 500 MG
2 TABLET ORAL
Qty: 0 | Refills: 0 | DISCHARGE
Start: 2021-07-15

## 2021-07-15 RX ADMIN — Medication 110 MILLIGRAM(S): at 09:00

## 2021-07-15 NOTE — DISCHARGE NOTE PROVIDER - CARE PROVIDER_API CALL
PCP,   Phone: (   )    -  Fax: (   )    -  Follow Up Time:    PCP,   Phone: (   )    -  Fax: (   )    -  Follow Up Time:     Elian Kyle)  Infectious Disease; Internal Medicine  [address] [place] [address]  Phone: [phone]  Fax: [phone]  Follow Up Time:

## 2021-07-15 NOTE — DISCHARGE NOTE PROVIDER - NSDCFUADDAPPT_GEN_ALL_CORE_FT
Follow up with your doctor in 3 days Follow up with your doctor in 3 days  Follow up with Dr. Kyle in the ID clinic for final culture and serology results

## 2021-07-15 NOTE — DISCHARGE NOTE NURSING/CASE MANAGEMENT/SOCIAL WORK - PATIENT PORTAL LINK FT
You can access the FollowMyHealth Patient Portal offered by [facility] by registering at the following website: [email or web address]. By joining [facility]’s FollowMyHealth portal, you will also be able to view your health information using other applications (apps) compatible with our system.

## 2021-07-15 NOTE — DISCHARGE NOTE PROVIDER - PROVIDER TOKENS
FREE:[LAST:[PCP],PHONE:[(   )    -],FAX:[(   )    -]] FREE:[LAST:[PCP],PHONE:[(   )    -],FAX:[(   )    -]],PROVIDER:[TOKEN:[[id number]]]

## 2021-07-15 NOTE — DISCHARGE NOTE PROVIDER - NSDCMRMEDTOKEN_GEN_ALL_CORE_FT
acetaminophen 325 mg oral tablet: 2 tab(s) orally every 6 hours, As needed, Temp greater or equal to 38C (100.4F), Mild Pain (1 - 3)  doxycycline monohydrate 150 mg oral capsule: 1 cap(s) orally 2 times a day

## 2021-07-15 NOTE — PROGRESS NOTE ADULT - ASSESSMENT
41 year old in good health no underlying medical problems was in [facility]   presents with days of fever, malasie, mild HA and no other localizing signs  negative ct of chest and abdomen. no history of tick bite or rash or IVDA  temps t o103.  bc pending     differential includes   babesia  ehrlichia    enterovirus  Lyme unlikely   doubt bacterial infection but bc pending   no signs of cns infection       start doxycycline   serologies ordered   pt feels much better   If he remains afebrile today, i think it is reasonable to discharge late today on po doxycycline to complete 10 day course  Pt has my card and will call for any problems'  disucssed with wife who speaks english and understands as did patient  pt knows to avoid sunlight

## 2021-07-15 NOTE — DISCHARGE NOTE NURSING/CASE MANAGEMENT/SOCIAL WORK - NSDCFUADDAPPT_GEN_ALL_CORE_FT
Follow up with your doctor in 3 days  Follow up with Dr. Kyle in the ID clinic for final culture and serology results

## 2021-07-15 NOTE — DISCHARGE NOTE PROVIDER - NSDCCPCAREPLAN_GEN_ALL_CORE_FT
PRINCIPAL DISCHARGE DIAGNOSIS  Diagnosis: Fever  Assessment and Plan of Treatment: Continue antobiotics as prescribed  Follow up with you doctor in 3 days

## 2021-07-15 NOTE — PROGRESS NOTE ADULT - ASSESSMENT
patient with fever, tachycardia, with myalgias, nondiagnostic UA but with significant bandemia.    r/o  babesiosis or ehrlichiosis /sent serologies.   ID consult          Problem/Plan - 1:  ·  Problem: Fever, unspecified fever cause.   dxycycline as per id    Lyme, babesiosis and ehrlichiosis serologies negative.  Empiric Doxycycline for 10 days. Clearecd by ID for DC.     Problem/Plan - 2:  ·  Problem: Dehydration. resolved   inc diet     Problem/Plan - 3:  ·  Problem: Need for prophylactic measure.  Plan: Patient agrees to pharmacologic DVT prophylaxis.     DC home. Follow with PMD/ ID in 3-4 days. QA    Anton Herbert MD pager [phone]

## 2021-07-15 NOTE — PROGRESS NOTE ADULT - SUBJECTIVE AND OBJECTIVE BOX
Patient is a 41y old  Male who presents with a chief complaint of Fever, myalgias for 5 days (15 Jul 2021 12:38)    Coverage for Dr. Mahendra Tineo   SUBJECTIVE / OVERNIGHT EVENTS: Comfortable without new complaints.  Wants to go home.  Review of Systems  chest pain no  palpitations no  sob no  nausea no  headache no    MEDICATIONS  (STANDING):  doxycycline IVPB      doxycycline IVPB 100 milliGRAM(s) IV Intermittent every 12 hours  enoxaparin Injectable 40 milliGRAM(s) SubCutaneous daily  sodium chloride 0.9%. 1000 milliLiter(s) (100 mL/Hr) IV Continuous <Continuous>    MEDICATIONS  (PRN):  acetaminophen   Tablet .. 650 milliGRAM(s) Oral every 6 hours PRN Temp greater or equal to 38C (100.4F), Mild Pain (1 - 3)      Vital Signs Last 24 Hrs  T(C): 36.7 (15 Jul 2021 13:41), Max: 37.1 (15 Jul 2021 00:13)  T(F): 98.1 (15 Jul 2021 13:41), Max: 98.8 (15 Jul 2021 00:13)  HR: 87 (15 Jul 2021 13:41) (84 - 90)  BP: 148/75 (15 Jul 2021 13:41) (106/65 - 148/75)  BP(mean): --  RR: 18 (15 Jul 2021 13:41) (18 - 18)  SpO2: 98% (15 Jul 2021 13:41) (96% - 98%)    PHYSICAL EXAM:  GENERAL: NAD, well-developed  HEAD:  Atraumatic, Normocephalic  EYES: EOMI, PERRLA, conjunctiva and sclera clear  NECK: Supple, No JVD  CHEST/LUNG: Clear to auscultation bilaterally; No wheeze  HEART: Regular rate and rhythm; No murmurs, rubs, or gallops  ABDOMEN: Soft, Nontender, Nondistended; Bowel sounds present  EXTREMITIES:  2+ Peripheral Pulses, No clubbing, cyanosis, or edema  PSYCH: AAOx3  NEUROLOGY: non-focal  SKIN: No rashes or lesions    LABS:                        14.2   2.76  )-----------( 83       ( 15 Jul 2021 07:00 )             40.1     07-15    134<L>  |  101  |  7   ----------------------------<  110<H>  3.9   |  21<L>  |  0.90    Ca    9.1      15 Jul 2021 07:04    TPro  7.4  /  Alb  4.2  /  TBili  2.1<H>  /  DBili  x   /  AST  78<H>  /  ALT  59<H>  /  AlkPhos  84  07-13      CARDIAC MARKERS ( 2021 17:53 )  x     / x     / 249 U/L / x     / x          Urinalysis Basic - ( 2021 18:20 )    Color: Yellow / Appearance: Clear / S.018 / pH: x  Gluc: x / Ketone: Negative  / Bili: Negative / Urobili: 3 mg/dL   Blood: x / Protein: Trace / Nitrite: Negative   Leuk Esterase: Negative / RBC: 0 /hpf / WBC 1 /HPF   Sq Epi: x / Non Sq Epi: 0 /hpf / Bacteria: Negative        Babesia microti PCR, Blood. (collected 2021 16:13)  Source: .Blood  Final Report (2021 19:20):    Babesia microti PCR    Results: NOT detected    ***************Result Note*************    The detection of Babesia microti by PCR has only been    validated for whole blood; this test has not been approved    by the US Food and Drug Administration (FDA). Performance    characteristics of this assay have been determined by    [facility]. The clinical significance    of results should be considered in conjunction with the    overall clinical presentation of the patient. Result is not    intended to be used as the sole means for clinical diagnosis    or patient management decisions.    One negative sample does not necessarily rule    out the presence of a parasitic infection.    Culture - Urine (collected 2021 22:48)  Source: .Urine Clean Catch (Midstream)  Final Report (2021 20:26):    No growth    Culture - Blood (collected 2021 22:14)  Source: .Blood Blood-Peripheral  Preliminary Report (2021 23:02):    No growth to date.    Culture - Blood (collected 2021 22:14)  Source: .Blood Blood-Peripheral  Preliminary Report (2021 23:02):    No growth to date.        RADIOLOGY & ADDITIONAL TESTS:    Imaging Personally Reviewed:    Consultant(s) Notes Reviewed:      Care Discussed with Consultants/Other Providers:  
  Patient is a 41y old  Male who presents with a chief complaint of Fever, myalgias for 5 days (2021 12:11)      INTERVAL HPI/OVERNIGHT EVENTS:    Medications:MEDICATIONS  (STANDING):  doxycycline IVPB      doxycycline IVPB 100 milliGRAM(s) IV Intermittent every 12 hours  enoxaparin Injectable 40 milliGRAM(s) SubCutaneous daily  sodium chloride 0.9%. 1000 milliLiter(s) (100 mL/Hr) IV Continuous <Continuous>    MEDICATIONS  (PRN):  acetaminophen   Tablet .. 650 milliGRAM(s) Oral every 6 hours PRN Temp greater or equal to 38C (100.4F), Mild Pain (1 - 3)    hpi reviewed  Allergies: Allergies    No Known Allergies    Intolerances          FAMILY HISTORY:  No pertinent family history in first degree relatives          PAST MEDICAL & SURGICAL HISTORY:  No pertinent past medical history    No significant past surgical history        REVIEW OF SYSTEMS:  aches  fever / h/as   ENMT:  No difficulty hearing, tinnitus, vertigo; No sinus or throat pain  NECK: No pain or stiffness    RESPIRATORY: No cough, wheezing, chills or hemoptysis; No shortness of breath  CARDIOVASCULAR: No chest pain, palpitations, dizziness,   GASTROINTESTINAL: No abdominal or epigastric pain. No nausea, vomiting, or hematemesis;   GENITOURINARY: No dysuria, frequency, hematuria, or incontinence        T(C): 37.4 (21 @ 10:11), Max: 39.5 (21 @ 23:36)  HR: 104 (21 @ 10:11) (86 - 122)  BP: 122/75 (21 @ 10:11) (111/74 - 158/86)  RR: 18 (21 @ 10:11) (12 - 27)  SpO2: 98% (21 @ 10:11) (95% - 100%)  Wt(kg): --Vital Signs Last 24 Hrs  T(C): 37.4 (2021 10:11), Max: 39.5 (2021 23:36)  T(F): 99.4 (2021 10:11), Max: 103.1 (2021 23:36)  HR: 104 (2021 10:11) (86 - 122)  BP: 122/75 (2021 10:11) (111/74 - 158/86)  BP(mean): 98 (2021 22:48) (98 - 103)  RR: 18 (2021 10:11) (12 - 27)  SpO2: 98% (2021 10:11) (95% - 100%)  I&O's Summary      PHYSICAL EXAM:  GENERAL: NAD, well-groomed, well-developed  HEAD:  Atraumatic, Normocephalic  EYES: EOMI, PERRLA, conjunctiva and sclera clear  ENMT: No tonsillar erythema, exudates, or enlargement; Moist mucous membranes, Good dentition, No lesions  NECK: Supple, No JVD, Normal thyroid  NERVOUS SYSTEM:  Alert & Oriented X3, Good concentration; Motor Strength 5/5 B/L upper and lower extremities; DTRs 2+ intact and symmetric  CHEST/LUNG: Clear to percussion bilaterally; No rales, rhonchi, wheezing, or rubs  HEART: Regular rate and rhythm; No murmurs, rubs, or gallops  ABDOMEN: Soft, Nontender, Nondistended; Bowel sounds present  EXTREMITIES:  2+ Peripheral Pulses, No clubbing, cyanosis, or edema  LYMPH: No lymphadenopathy noted  SKIN: No rashes or lesions    Consultant(s) Notes Reviewed:  [x ] YES  [ ] NO  Care Discussed with Consultants/Other Providerscpk [ x] YES  [ ] NO    LABS:                    CBC Full  -  ( 2021 11:51 )  WBC Count : 2.56 K/uL  RBC Count : 4.93 M/uL  Hemoglobin : 15.1 g/dL  Hematocrit : 42.1 %  Platelet Count - Automated : 84 K/uL  Mean Cell Volume : 85.4 fl  Mean Cell Hemoglobin : 30.6 pg  Mean Cell Hemoglobin Concentration : 35.9 gm/dL  Auto Neutrophil # : 2.01 K/uL  Auto Lymphocyte # : 0.37 K/uL  Auto Monocyte # : 0.13 K/uL  Auto Eosinophil # : 0.00 K/uL  Auto Basophil # : 0.02 K/uL  Auto Neutrophil % : 58.6 %  Auto Lymphocyte % : 14.6 %  Auto Monocyte % : 5.2 %  Auto Eosinophil % : 0.0 %  Auto Basophil % : 0.9 %      0714    134<L>  |  100  |  8   ----------------------------<  128<H>  3.4<L>   |  22  |  1.10    Ca    9.0      2021 11:51    TPro  7.4  /  Alb  4.2  /  TBili  2.1<H>  /  DBili  x   /  AST  78<H>  /  ALT  59<H>  /  AlkPhos  84  07-13      Urinalysis Basic - ( 2021 18:20 )    Color: Yellow / Appearance: Clear / S.018 / pH: x  Gluc: x / Ketone: Negative  / Bili: Negative / Urobili: 3 mg/dL   Blood: x / Protein: Trace / Nitrite: Negative   Leuk Esterase: Negative / RBC: 0 /hpf / WBC 1 /HPF   Sq Epi: x / Non Sq Epi: 0 /hpf / Bacteria: Negative          RADIOLOGY & ADDITIONAL TESTS:    Imaging Personally Reviewed:  [ ] YES  [ ] NO
infectious diseases progress note:    Patient is a 41y old  Male who presents with a chief complaint of Fever, myalgias for 5 days (2021 12:37)        Fever due to unspecified condition          ROS:  CONSTITUTIONAL:  Negative fever or chills, feels well, good appetite  EYES:  Negative  blurry vision or double vision  CARDIOVASCULAR:  Negative for chest pain or palpitations  RESPIRATORY:  Negative for cough, wheezing, or SOB   GASTROINTESTINAL:  Negative for nausea, vomiting, diarrhea, constipation, or abdominal pain  GENITOURINARY:  Negative frequency, urgency or dysuria  NEUROLOGIC:  No headache, confusion, dizziness, lightheadedness    Allergies    No Known Allergies    Intolerances        ANTIBIOTICS/RELEVANT:  antimicrobials  doxycycline IVPB      doxycycline IVPB 100 milliGRAM(s) IV Intermittent every 12 hours    immunologic:    OTHER:  acetaminophen   Tablet .. 650 milliGRAM(s) Oral every 6 hours PRN  enoxaparin Injectable 40 milliGRAM(s) SubCutaneous daily  sodium chloride 0.9%. 1000 milliLiter(s) IV Continuous <Continuous>      Objective:  Vital Signs Last 24 Hrs  T(C): 36.7 (15 Jul 2021 07:29), Max: 39.3 (2021 14:21)  T(F): 98.1 (15 Jul 2021 07:29), Max: 102.8 (2021 14:21)  HR: 86 (15 Jul 2021 07:29) (84 - 109)  BP: 106/72 (15 Jul 2021 07:29) (106/65 - 133/75)  BP(mean): --  RR: 18 (15 Jul 2021 07:29) (18 - 18)  SpO2: 97% (15 Jul 2021 07:29) (96% - 99%)    PHYSICAL EXAM:  Constitutional:Well-developed, well nourished--no acute distress  Eyes:JEVON, EOMI  Ear/Nose/Throat: no oral lesion, no sinus tenderness on percussion	  Neck:no JVD, no lymphadenopathy, supple  Respiratory: CTA clara  Cardiovascular: S1S2 RRR, no murmurs  Gastrointestinal:soft, (+) BS, no HSM  Extremities:no e/e/c        LABS:                        14.2   2.76  )-----------( 83       ( 15 Jul 2021 07:00 )             40.1     07-15    134<L>  |  101  |  7   ----------------------------<  110<H>  3.9   |  21<L>  |  0.90    Ca    9.1      15 Jul 2021 07:04    TPro  7.4  /  Alb  4.2  /  TBili  2.1<H>  /  DBili  x   /  AST  78<H>  /  ALT  59<H>  /  AlkPhos  84        Urinalysis Basic - ( 2021 18:20 )    Color: Yellow / Appearance: Clear / S.018 / pH: x  Gluc: x / Ketone: Negative  / Bili: Negative / Urobili: 3 mg/dL   Blood: x / Protein: Trace / Nitrite: Negative   Leuk Esterase: Negative / RBC: 0 /hpf / WBC 1 /HPF   Sq Epi: x / Non Sq Epi: 0 /hpf / Bacteria: Negative          MICROBIOLOGY:    RECENT CULTURES:   @ 16:13 .Blood                Babesia microti PCR  Results: NOT detected  ***************Result Note*************  The detection of Babesia microti by PCR has only been  validated for whole blood; this test has not been approved  by the US Food and Drug Administration (FDA). Performance  characteristics of this assay have been determined by  [employer]. The clinical significance  of results should be considered in conjunction with the  overall clinical presentation of the patient. Result is not  intended to be used as the sole means for clinical diagnosis  or patient management decisions.  One negative sample does not necessarily rule  out the presence of a parasitic infection.     @ 22:48 .Urine Clean Catch (Midstream)                No growth     @ 22:14 .Blood Blood-Peripheral                No growth to date.          RESPIRATORY CULTURES:              RADIOLOGY & ADDITIONAL STUDIES:        Pager [phone]  After 5 pm/weekends or if no response :[phone]

## 2021-07-15 NOTE — DISCHARGE NOTE PROVIDER - HOSPITAL COURSE
41 year old in good health no underlying medical problems was in [facility]   presents with days of fever, malasie, mild HA and no other localizing signs  negative ct of chest and abdomen. no history of tick bite or rash or IVDA  temps t o103.  bc pending     differential includes   babesia  ehrlichia    enterovirus  Lyme unlikely   doubt bacterial infection but bc pending   no signs of cns infection       start doxycycline   serologies ordered   pt feels much better   If he remains afebrile today, i think it is reasonable to discharge late today on po doxycycline to complete 10 day course    D/C to home.

## 2021-07-15 NOTE — DISCHARGE NOTE PROVIDER - CARE PROVIDERS DIRECT ADDRESSES
,DirectAddress_Unknown ,DirectAddress_Unknown,[email or web address]

## 2021-07-16 LAB
A PHAGOCYTOPH DNA BLD QL NAA+PROBE: POSITIVE
E CHAFFEENSIS DNA BLD QL NAA+PROBE: NEGATIVE — SIGNIFICANT CHANGE UP
E EWINGII DNA SPEC QL NAA+PROBE: NEGATIVE — SIGNIFICANT CHANGE UP
EHRLICHIA DNA SPEC QL NAA+PROBE: NEGATIVE — SIGNIFICANT CHANGE UP
R RICKETTSI AB SER-ACNC: NEGATIVE — SIGNIFICANT CHANGE UP
R RICKETTSI IGM SER-ACNC: 0.39 INDEX — SIGNIFICANT CHANGE UP (ref 0–0.89)
RICK SF IGG TITR SER IF: NEGATIVE — SIGNIFICANT CHANGE UP
RICK SF IGM TITR SER IF: 0.39 INDEX — SIGNIFICANT CHANGE UP (ref 0–0.89)

## 2021-07-18 LAB
CULTURE RESULTS: SIGNIFICANT CHANGE UP
CULTURE RESULTS: SIGNIFICANT CHANGE UP
SPECIMEN SOURCE: SIGNIFICANT CHANGE UP
SPECIMEN SOURCE: SIGNIFICANT CHANGE UP

## 2021-07-20 LAB
B MICROTI IGG TITR SER: SIGNIFICANT CHANGE UP
B MICROTI IGM TITR SER: SIGNIFICANT CHANGE UP

## 2021-07-22 LAB
A PHAGOCYTOPH IGG TITR SER IF: NEGATIVE — SIGNIFICANT CHANGE UP
A PHAGOCYTOPH IGM TITR SER IF: NEGATIVE — SIGNIFICANT CHANGE UP
E CHAFFEENSIS IGG TITR SER: NEGATIVE — SIGNIFICANT CHANGE UP
E CHAFFEENSIS IGM TITR SER IF: NEGATIVE — SIGNIFICANT CHANGE UP
